# Patient Record
(demographics unavailable — no encounter records)

---

## 2024-11-26 NOTE — PLAN
[FreeTextEntry1] :  Patient screened for depression- no signs of clinical depression.  PHQ-9 scores reviewed over the course of the visit  5-10 minutes of face to face time spent.  Follow up with changes in mood including other symptoms of anxiety. CALL AFTER SONO TO DISCUSS DISCUSSED MIRENA VS [facility]PS

## 2024-11-26 NOTE — REASON FOR VISIT
[TextEntry] : WENT TO ER AT [facility] IN EARLY NOV W CHEST PAIN.  DIAGNOSED W ANEMIA.  CT DONE AND FIBROID UTERUS NOTED.  MENSES MONTHLY W 3 HEAVY DAYS AND 4 LIGHTER DAYS.  SATURATING PAD Q 4 HRS ON HEAVY DAYS.  DENIES INTERMENSTRUAL BLEEDING.  DENIES DYSMENORRHEA;  RECEIVED TRANSFUSION IN ER AND CHEST PAIN RESOLVED.  REPORTS LONG HX CHRONIC ANEMIA

## 2024-11-26 NOTE — REASON FOR VISIT
[TextEntry] : WENT TO ER AT [facility] IN EARLY NOV W CHEST PAIN.  DIAGNOSED W ANEMIA.  CT DONE AND FIBROID UTERUS NOTED.  MENSES MONTHLY W 3 HEAVY DAYS AND 4 LIGHTER DAYS.  SATURATING PAD Q 4 HRS ON HEAVY DAYS.  DENIES INTERMENSTRUAL BLEEDING.  DENIES DYSMENORRHEA;  RECEIVED TRANSFUSION IN ER AND CHEST PAIN RESOLVED.  REPORTS LONG HX CHRONIC ANEMIA

## 2024-11-26 NOTE — HISTORY OF PRESENT ILLNESS
[Regular Cycle Intervals] : periods have been regular [Currently Active] : currently active [Condoms] : Condoms [Mammogramdate] : NEVER [PapSmeardate] : 2017 [PGHxTotal] : 5 [facility]

## 2024-11-26 NOTE — HISTORY OF PRESENT ILLNESS
[Regular Cycle Intervals] : periods have been regular [Currently Active] : currently active [Condoms] : Condoms [Mammogramdate] : NEVER [PapSmeardate] : 2017 [PGHxTotal] : 5 [[facility]